# Patient Record
Sex: MALE | Race: WHITE | NOT HISPANIC OR LATINO
[De-identification: names, ages, dates, MRNs, and addresses within clinical notes are randomized per-mention and may not be internally consistent; named-entity substitution may affect disease eponyms.]

---

## 2024-08-05 ENCOUNTER — NON-APPOINTMENT (OUTPATIENT)
Age: 79
End: 2024-08-05

## 2024-08-06 ENCOUNTER — APPOINTMENT (OUTPATIENT)
Dept: UROLOGY | Facility: CLINIC | Age: 79
End: 2024-08-06

## 2024-08-06 ENCOUNTER — NON-APPOINTMENT (OUTPATIENT)
Age: 79
End: 2024-08-06

## 2024-08-06 PROBLEM — R30.0 DYSURIA: Status: ACTIVE | Noted: 2024-08-06

## 2024-08-06 PROCEDURE — G2211 COMPLEX E/M VISIT ADD ON: CPT

## 2024-08-06 PROCEDURE — 99204 OFFICE O/P NEW MOD 45 MIN: CPT

## 2024-08-07 NOTE — ASSESSMENT
[FreeTextEntry1] : 79 yo male with hx of LUTS s/p TURP 5 years ago, now back in retention in the setting of constipation.  I discussed with him his options.  I advised him to continue with alpha blockers and to go on a bowel regimen to treat his constipation which is likely contributing to his retention.  We also discussed the option for diagnostic testing including TRUS and cystoscopy to evaluate his prostate.  He may have regrowth of prostatic tissue which is obstructing him and we can consider performing a bladder outlet procedure to address these issues.  He is amenable to this.  He will return on Friday for a TOV, TRUS, and cysto.   Plan: 1. Cysto and TRUS on Friday 2. Cont tamsulosin 3. Bowel regimen with laxatives

## 2024-08-07 NOTE — HISTORY OF PRESENT ILLNESS
[FreeTextEntry1] : 78M with history of BPH (108 cc 5 yrs ago s/p TURP 4 yrs ago with Dr Vegas) and elevated PSA, presents for recent episode of retention at home with 1000cc output upon catheter placement last week, negative UCx. Referred by Dr Vegas. Had similar retention episode 4 years ago, required a TURP. At baseline reports some urgency, weak urinary stream, nocturia x1, with episodes of incomplete emptying. Was not taking any BPH medications but Dr Vegas started him on flomax 5mg and finasteride 4 days ago. Pt has been constipation since catheter placement, not on any stool softener. Prostate biopsy in 2012 negative. Negative Prostate MRI 2019.  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

## 2024-08-07 NOTE — PHYSICAL EXAM
[General Appearance - Well Developed] : well developed [General Appearance - Well Nourished] : well nourished [Normal Appearance] : normal appearance [Well Groomed] : well groomed [General Appearance - In No Acute Distress] : no acute distress [Normal Station and Gait] : the gait and station were normal for the patient's age [Oriented To Time, Place, And Person] : oriented to person, place, and time [Affect] : the affect was normal [Heart Rate And Rhythm] : heart rate and rhythm were normal [] : no respiratory distress [No Focal Deficits] : no focal deficits

## 2024-08-09 ENCOUNTER — APPOINTMENT (OUTPATIENT)
Dept: UROLOGY | Facility: CLINIC | Age: 79
End: 2024-08-09

## 2024-08-09 PROBLEM — N40.1 BPH WITH OBSTRUCTION/LOWER URINARY TRACT SYMPTOMS: Status: ACTIVE | Noted: 2024-08-07

## 2024-08-09 PROBLEM — N39.0 UTI (URINARY TRACT INFECTION), BACTERIAL: Status: ACTIVE | Noted: 2024-08-09

## 2024-08-09 PROBLEM — R33.9 URINARY RETENTION: Status: ACTIVE | Noted: 2024-08-07

## 2024-08-09 PROCEDURE — 76872 US TRANSRECTAL: CPT

## 2024-08-09 PROCEDURE — 99214 OFFICE O/P EST MOD 30 MIN: CPT

## 2024-08-09 NOTE — HISTORY OF PRESENT ILLNESS
[FreeTextEntry1] : 8/6/24: 78M with history of BPH (108 cc 5 yrs ago s/p TURP 4 yrs ago with Dr Vegas) and elevated PSA, presents for recent episode of retention at home with 1000cc output upon catheter placement last week, negative UCx. Referred by Dr Vegas. Had similar retention episode 4 years ago, required a TURP. At baseline reports some urgency, weak urinary stream, nocturia x1, with episodes of incomplete emptying. Was not taking any BPH medications but Dr Vegas started him on flomax 5mg and finasteride 4 days ago. Pt has been constipation since catheter placement, not on any stool softener. Prostate biopsy in 2012 negative. Negative Prostate MRI 2019.  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  His Ucx is positive with preliminary report showing > 100K gram negative rods.  We will postpone the cystoscopy pending treatment of the UTI.  TRUS today showed a 97 cc prostate.  Patient able to void twice in the office with PVR < 40 cc.

## 2024-08-09 NOTE — ASSESSMENT
[FreeTextEntry1] : 79 yo male with BPH/LUTSs/p bladder outlet procedure 4-5 years ago now with recent episode of AUR.  TRUS shows a 97 cc prostate.  He was able to void today after TOV.  We discussed option to proceed with cystoscopy to assess for bladder outlet procedure vs. observation for now on alpha blocker.  He prefers to hold off on intervention at this time.  We will await finalized Ucx results, but will start empiric cipro.    Plan: 1. Start Cipro 2. Await finalized Ucx results 3. RTC 3-6 months 4. Cont tamsulosin

## 2024-08-12 ENCOUNTER — NON-APPOINTMENT (OUTPATIENT)
Age: 79
End: 2024-08-12

## 2024-09-04 RX ORDER — TAMSULOSIN HYDROCHLORIDE 0.4 MG/1
CAPSULE ORAL
Refills: 0 | Status: ACTIVE | COMMUNITY

## 2025-09-08 ENCOUNTER — NON-APPOINTMENT (OUTPATIENT)
Age: 80
End: 2025-09-08

## 2025-09-09 ENCOUNTER — APPOINTMENT (OUTPATIENT)
Dept: ENDOCRINOLOGY | Facility: CLINIC | Age: 80
End: 2025-09-09
Payer: MEDICARE

## 2025-09-09 ENCOUNTER — LABORATORY RESULT (OUTPATIENT)
Age: 80
End: 2025-09-09

## 2025-09-09 VITALS
BODY MASS INDEX: 25.62 KG/M2 | WEIGHT: 179 LBS | HEART RATE: 68 BPM | HEIGHT: 70 IN | DIASTOLIC BLOOD PRESSURE: 78 MMHG | SYSTOLIC BLOOD PRESSURE: 136 MMHG

## 2025-09-09 DIAGNOSIS — R79.89 OTHER SPECIFIED ABNORMAL FINDINGS OF BLOOD CHEMISTRY: ICD-10-CM

## 2025-09-09 DIAGNOSIS — R73.09 OTHER ABNORMAL GLUCOSE: ICD-10-CM

## 2025-09-09 DIAGNOSIS — R73.03 PREDIABETES.: ICD-10-CM

## 2025-09-09 PROCEDURE — 36415 COLL VENOUS BLD VENIPUNCTURE: CPT

## 2025-09-09 PROCEDURE — 99205 OFFICE O/P NEW HI 60 MIN: CPT

## 2025-09-09 PROCEDURE — G2211 COMPLEX E/M VISIT ADD ON: CPT

## 2025-09-12 LAB
ESTIMATED AVERAGE GLUCOSE: 126 MG/DL
HBA1C MFR BLD HPLC: 6 %
TSH SERPL-ACNC: 5.31 UIU/ML